# Patient Record
Sex: FEMALE | Race: WHITE
[De-identification: names, ages, dates, MRNs, and addresses within clinical notes are randomized per-mention and may not be internally consistent; named-entity substitution may affect disease eponyms.]

---

## 2017-07-31 NOTE — OR
DATE OF PROCEDURE:  07/27/2017

 

PROCEDURE:  Colonoscopy.

 

FINDINGS:

1. Descending colon polyp approximately 5 mm, completely removed (using cold biopsy

    forceps).

2. Linear ulceration in the rectum (gently biopsied using cold biopsy forceps to rule out

    malignancy).

3. No diverticulitis; 1 diverticula left.

 

COMPLICATIONS:  None.

 

ASSISTANT:  None.

 

ANESTHESIA:  MAC.

 

RISKS:  Risks, benefits, alternatives, and limitations including, but not limited to

infection, bleeding, and perforation were explained to the patient, and she wished to

proceed.

 

PROCEDURE IN DETAIL:  The patient was placed in left lateral decubitus position.  Digital

rectal exam was performed without abnormality.  The scope was introduced and advanced

atraumatically to the ileocecal valve.  A photo was taken.  The scope was brought back to

the ascending, transverse, descending colon, and retroflexed.

 

At the colonic anastomosis, there was one small probable polyp or this could be a fold due

to the anastomosis.  Nonetheless, this was removed using cold biopsy forceps.

 

The patient had one tic remaining, no other evidence of diverticulosis or diverticulitis.

 

In the rectum, there was a linear ulceration, which was not actively bleeding, but was

biopsied using cold biopsy forceps.  No abnormalities of retroflexed.  The patient tolerated

the procedure well.

 

 

 

 

Silverio Donnelly MD

DD:  07/27/2017 08:06:01

DT:  07/27/2017 10:16:12

Job #:  [id number]

## 2021-07-19 ENCOUNTER — HOSPITAL ENCOUNTER (INPATIENT)
Dept: HOSPITAL 11 - JP.MS | Age: 76
LOS: 2 days | Discharge: HOME | DRG: 580 | End: 2021-07-21
Attending: SURGERY | Admitting: SURGERY
Payer: MEDICARE

## 2021-07-19 DIAGNOSIS — G89.29: ICD-10-CM

## 2021-07-19 DIAGNOSIS — F41.9: ICD-10-CM

## 2021-07-19 DIAGNOSIS — K58.9: ICD-10-CM

## 2021-07-19 DIAGNOSIS — M54.2: ICD-10-CM

## 2021-07-19 DIAGNOSIS — G47.00: ICD-10-CM

## 2021-07-19 DIAGNOSIS — K57.30: ICD-10-CM

## 2021-07-19 DIAGNOSIS — Q60.0: ICD-10-CM

## 2021-07-19 DIAGNOSIS — I10: ICD-10-CM

## 2021-07-19 DIAGNOSIS — E78.5: ICD-10-CM

## 2021-07-19 DIAGNOSIS — Z79.82: ICD-10-CM

## 2021-07-19 DIAGNOSIS — Z79.899: ICD-10-CM

## 2021-07-19 DIAGNOSIS — C50.912: Primary | ICD-10-CM

## 2021-07-19 PROCEDURE — 07B60ZX EXCISION OF LEFT AXILLARY LYMPHATIC, OPEN APPROACH, DIAGNOSTIC: ICD-10-PCS | Performed by: SURGERY

## 2021-07-19 PROCEDURE — 0HBV0ZZ EXCISION OF BILATERAL BREAST, OPEN APPROACH: ICD-10-PCS | Performed by: SURGERY

## 2021-07-19 PROCEDURE — 0HBU0ZZ EXCISION OF LEFT BREAST, OPEN APPROACH: ICD-10-PCS | Performed by: SURGERY

## 2021-07-19 PROCEDURE — 07B50ZZ EXCISION OF RIGHT AXILLARY LYMPHATIC, OPEN APPROACH: ICD-10-PCS | Performed by: SURGERY

## 2021-07-19 RX ADMIN — KETOCONAZOLE SCH: 20 CREAM TOPICAL at 11:44

## 2021-07-19 RX ADMIN — KETOCONAZOLE SCH: 20 CREAM TOPICAL at 20:40

## 2021-07-20 RX ADMIN — KETOCONAZOLE SCH: 20 CREAM TOPICAL at 10:28

## 2021-07-20 RX ADMIN — Medication SCH: at 10:14

## 2021-07-20 RX ADMIN — KETOCONAZOLE SCH: 20 CREAM TOPICAL at 20:02

## 2021-07-21 VITALS — HEART RATE: 51 BPM

## 2021-07-21 VITALS — SYSTOLIC BLOOD PRESSURE: 139 MMHG | DIASTOLIC BLOOD PRESSURE: 61 MMHG

## 2021-07-21 RX ADMIN — Medication SCH: at 08:55

## 2021-07-21 RX ADMIN — KETOCONAZOLE SCH: 20 CREAM TOPICAL at 09:18

## 2021-07-21 NOTE — DISCH
ADMISSION DIAGNOSIS:  Invasive ductal carcinoma of left breast.

 

DISCHARGE DIAGNOSIS:

1. Bilateral modified radical mastectomy for cancer of the left breast and prophylactic

    right mastectomy for left breast cancer.

2. Date of procedure:  07/19/2021.  Surgeon:  Fabricio Garcia MD.

 

HISTORY:  Vivian Schoenborn is a pleasant 76-year-old female with left breast cancer.  After

preoperative evaluation and discussion of possible risks and possible complications, she

wished to proceed with surgical procedure.

 

HOSPITAL COURSE:  Naye had her surgery on 07/19/2021.  She had no operative complications.

On postoperative day #1, she was started on a regular diet and she was taught how to manage

her 4 MARY drains when she goes home.  On postoperative day 2, Naye was able to be

discharged to home with no complications.

 

PHYSICAL EXAMINATION:

GENERAL:  Vivian Schoenborn is a pleasant 76-year-old female.  Height is 5 feet 6.14 inches,

weight is 160 pounds.

VITAL SIGNS:  TPR:  95.4, 51, 15.  Blood pressure 139/61.

HEENT:  Negative.

NECK:  Supple.

HEART:  Regular rate and rhythm.

LUNGS:  Clear.

CHEST:  Mastectomy staples are intact.  No hematoma or seroma noted at incision site.  She

has 4 MARY drains and they have put out 105, 22, 45, and 45 respectively of a light red

drainage.

ABDOMEN:  Negative.

EXTREMITIES:  Negative for peripheral edema.

 

DISPOSITION:  Discharged to home.

 

CONDITION:  Stable and improving.

 

FOLLOWUP APPOINTMENT:  With Fabricio Garcia MD on 07/28/2021 at 10:30 a.m.  She is to get an

Oncology appointment made by nursing staff for that same day.

 

MEDICATIONS:  Resume home medications.

1. Tylenol 650 mg p.o. q.6 hours p.r.n. pain.

2. Aspirin 81 mg p.o. daily.

3. Celexa 30 mg p.o. daily.

4. Ketoconazole 2% cream topical b.i.d. to affected area.

5. Metoprolol succinate/Toprol-XL 50 mg p.o. at bedtime.

6. Ambien 2.5 mg p.o. at bedtime.

7. Fosamax 70 mg p.o. weekly.

8. Hydrochlorothiazide 12.5 mg p.o. daily p.r.n.

 

DIET:  Regular diet as tolerated.  Drink 8 to 10 glasses of water a day.

 

ACTIVITY:  No lifting greater than 10 pounds for 6 weeks.

 

Driving:  Do not drive for 1 week.

 

May shower.

 

DISCHARGE INSTRUCTIONS:  Keep operative site clean and dry.  Wear clothing that is

comfortable.

 

Strip, empty, measure, and record MARY drainage 4 times a day and bring record of drainage to

clinic appointments.

 

Notify provider if any fever, increased pain, swelling, redness, drainage, nausea, or

vomiting.

 

SPECIAL INSTRUCTION:  Use incentive spirometer 10 times every hour while awake for 1 week.

 

DD:  07/21/2021 08:06:11

DT:  07/21/2021 10:10:43

Job #:  [id number]
Statement Selected

## 2021-07-25 NOTE — OR
DATE OF PROCEDURE:  07/19/2021

 

SURGEON:  Fabricio Garcia MD

 

PREOPERATIVE DIAGNOSES:

1. Carcinoma of left breast.

2. The patient's desire for contralateral prophylactic mastectomy.

 

OPERATIVE PROCEDURES:

1. Left modified radical mastectomy with injection procedure for sentinel lymph node

    biopsy (54752, [id number]).

2. Left modified radical mastectomy with injection procedure for identification of

    sentinel lymph nodes ([id number], [id number]).

 

ANESTHESIA:  General.

 

ASSISTANT:  Guillermina Villa PA-C

 

INDICATIONS FOR PROCEDURE:  A 76-year-old presenting with a carcinoma of the left breast

that is located in the left upper outer quadrant and has positive ER and MO receptors and

negative HER-2 assay.  The patient after preoperative discussion wishes to proceed with a

mastectomy.  She also after discussion wished to proceed with a contralateral prophylactic

mastectomy.  She is aware that this would result in relatively modest breast reduction

overall, but after formal discussion, did wish to proceed with a prophylactic contralateral

mastectomy.  Potential risks of procedure including bleeding, infection, or local or distant

tumor recurrence as well as possibility of cardiopulmonary, septic, or hemorrhagic

complications leading to death were discussed.  Possibility for radiation treatment of

positive lymph nodes identified on final staging was also gone over, and the patient is

aware that she may need chemotherapy and most likely not need adjuvant hormonal treatment

postprocedure.

 

DETAILS OF PROCEDURE:  The patient was taken to the operating room and placed in a supine

position.  After general endotracheal anesthesia was induced, the left breast, axilla, and

surrounding areas were prepped and draped.  Over the area of the tumor in the left upper

quadrant and in the subareolar dermis, 4 mL of isosulfan blue dye was injected.

 

A transversely oriented elliptical incision was made encompassing the area of the

subcutaneous biopsy site and nipple-areolar complex, carried down through the skin and

subcutaneous tissue.  Subcutaneous tissue flaps were then raised superiorly, inferiorly,

laterally, and medially to the usual extent, and the breast was reflected off the chest wall

in continuity with pectoralis major fascia.  At no point was there a sense of any tumor or

inflammatory response related to the tumor being nearby the plane of dissection.

 

The lymphatic vessels were easily stained with blue and carried down into the axilla where

some blue-stained nodes as well as some somewhat enlarged lymph nodes adjacent to that were

identified and excised.  These were sent for frozen section, which showed no evidence of

metastatic disease.

 

The area of resection was inspected.  2 Benja-Sharma drains were then placed through stab

wounds in __________ incision and positioned across the area of dissection and from there

into the axilla.  Incision was closed with some 3-0 Vicryl stitch deep, staples for the skin

and drains affixed with some 4-0 Vicryl stitch.

 

Attention was then taken to the right side.  The right side was similarly prepped and

draped, and at that point, new instruments, gowns, and gloves had been obtained, and the

subareolar dermis was injected again with 4 mL of isosulfan blue dye.  Basically, an

identical mastectomy was then accomplished on this side.  In this case, sentinel lymph nodes

were not sent for frozen section due to the low __________ of metastatic disease.  It was

noted that sentinel lymph nodes were obtained in this case given the fact that after

mastectomy, sentinel lymph node biopsy technique is not available and should there be an

occult carcinoma identified in the prophylactically removed breast, we would be left with

the difficulty of management of the lymph node from a surgical standpoint.  The sentinel

nodes were obtained after similar identification of the blue-stained components of the

nodes, and these were sent for permanent section.  The closure was identical as per the left

side, and a dressing applied.  The patient was taken to the recovery room in satisfactory

condition.

 

Physician assistant, Guillermina Villa PA-C, played an essential role in assisting in this case,

helping to position the patient, retract structures as needed, as well as suturing and

cutting sutures when indicated.  Her presence improved patient safety and decreased

operative time.

 

 

 

 

Fabricio Garcia MD

DD:  07/24/2021 16:29:23

DT:  07/25/2021 12:46:47

Job #:  [id number]